# Patient Record
Sex: FEMALE | Race: WHITE | NOT HISPANIC OR LATINO | URBAN - METROPOLITAN AREA
[De-identification: names, ages, dates, MRNs, and addresses within clinical notes are randomized per-mention and may not be internally consistent; named-entity substitution may affect disease eponyms.]

---

## 2018-10-30 ENCOUNTER — OFFICE (OUTPATIENT)
Dept: URBAN - METROPOLITAN AREA CLINIC 101 | Facility: CLINIC | Age: 33
End: 2018-10-30
Payer: COMMERCIAL

## 2018-10-30 VITALS
SYSTOLIC BLOOD PRESSURE: 118 MMHG | HEART RATE: 73 BPM | DIASTOLIC BLOOD PRESSURE: 77 MMHG | WEIGHT: 266 LBS | TEMPERATURE: 98.1 F | HEIGHT: 64 IN

## 2018-10-30 DIAGNOSIS — R14.1 GAS PAIN: ICD-10-CM

## 2018-10-30 DIAGNOSIS — R11.2 NAUSEA WITH VOMITING, UNSPECIFIED: ICD-10-CM

## 2018-10-30 DIAGNOSIS — R10.13 EPIGASTRIC PAIN: ICD-10-CM

## 2018-10-30 DIAGNOSIS — K59.09 OTHER CONSTIPATION: ICD-10-CM

## 2018-10-30 DIAGNOSIS — R63.4 ABNORMAL WEIGHT LOSS: ICD-10-CM

## 2018-10-30 DIAGNOSIS — R63.0 ANOREXIA: ICD-10-CM

## 2018-10-30 PROCEDURE — 99244 OFF/OP CNSLTJ NEW/EST MOD 40: CPT

## 2018-10-30 NOTE — SERVICEHPINOTES
SAMSON PIERRE   is a   33   year old    female who is being seen in consultation at the request of   ANDREE GOODWIN   for nausea, abdominal pain, gas. She reports approx. 3 months ago started having burning in chest, which went to epigastric pain. Nausea, couldn't eat. Was started on pantoprazole and probiotic which caused constipation. She also started on metformin and could not eat. She developed RLQ pain--PCP ordered a CT scan which was normal. But still could not eat--lost 15 lbs in a few weeks due to nausea. Cut metformin in half and restarted PPI--feels "ok" but still really gassy. Everything sits in epigastrium. Burning has resolved. BMs once daily, BSS type 2-3 feels incompletely evacuated. No blood in the stool or dark stools. Has not taken anything for BMs--was told to take stool softeners but has not yet taken. Was vomiting 1-2x/week at night prior to going to bed and then would feel better. Since starting PPI and taking regularly has not vomited. No regular NSAIDs. 1/2 ppd smoker. No alcohol. Tea and soda daily--has tried to cut back on soda due to gas. No fever or chills. She has never had a colonoscopy or EGD.BRGrandfather had colon cancer, was dx around age 60-70s. Father had pancreatic cancer but had colectomy? unsure why.KAREN

## 2018-11-15 ENCOUNTER — ON CAMPUS - OUTPATIENT (OUTPATIENT)
Dept: URBAN - METROPOLITAN AREA HOSPITAL 14 | Facility: HOSPITAL | Age: 33
End: 2018-11-15

## 2018-11-15 DIAGNOSIS — R63.4 ABNORMAL WEIGHT LOSS: ICD-10-CM

## 2018-11-15 DIAGNOSIS — R10.84 GENERALIZED ABDOMINAL PAIN: ICD-10-CM

## 2018-11-15 DIAGNOSIS — R11.2 NAUSEA WITH VOMITING, UNSPECIFIED: ICD-10-CM

## 2018-11-15 DIAGNOSIS — K29.60 OTHER GASTRITIS WITHOUT BLEEDING: ICD-10-CM

## 2018-11-15 PROCEDURE — 43239 EGD BIOPSY SINGLE/MULTIPLE: CPT

## 2018-12-19 ENCOUNTER — OFFICE (OUTPATIENT)
Dept: URBAN - METROPOLITAN AREA CLINIC 101 | Facility: CLINIC | Age: 33
End: 2018-12-19

## 2018-12-19 DIAGNOSIS — K76.0 FATTY (CHANGE OF) LIVER, NOT ELSEWHERE CLASSIFIED: ICD-10-CM

## 2018-12-19 PROCEDURE — 91200 LIVER ELASTOGRAPHY: CPT

## 2020-01-02 ENCOUNTER — OFFICE (OUTPATIENT)
Dept: URBAN - METROPOLITAN AREA CLINIC 101 | Facility: CLINIC | Age: 35
End: 2020-01-02

## 2020-01-02 DIAGNOSIS — K76.0 FATTY (CHANGE OF) LIVER, NOT ELSEWHERE CLASSIFIED: ICD-10-CM

## 2020-01-02 PROCEDURE — 91200 LIVER ELASTOGRAPHY: CPT

## 2020-01-16 ENCOUNTER — OFFICE (OUTPATIENT)
Dept: URBAN - METROPOLITAN AREA CLINIC 101 | Facility: CLINIC | Age: 35
End: 2020-01-16

## 2020-01-16 VITALS
WEIGHT: 281 LBS | HEART RATE: 69 BPM | SYSTOLIC BLOOD PRESSURE: 132 MMHG | TEMPERATURE: 98.1 F | DIASTOLIC BLOOD PRESSURE: 92 MMHG | HEIGHT: 64 IN

## 2020-01-16 DIAGNOSIS — R10.13 EPIGASTRIC PAIN: ICD-10-CM

## 2020-01-16 DIAGNOSIS — K76.0 FATTY (CHANGE OF) LIVER, NOT ELSEWHERE CLASSIFIED: ICD-10-CM

## 2020-01-16 PROCEDURE — 99214 OFFICE O/P EST MOD 30 MIN: CPT

## 2020-01-16 RX ORDER — PANTOPRAZOLE SODIUM 40 MG/1
TABLET, DELAYED RELEASE ORAL
Qty: 90 | Refills: 3 | Status: ACTIVE

## 2020-01-16 NOTE — SERVICEHPINOTES
SAMSON PIERRE   is a   34  female who presents for follow up. Still taking pantoprazole which works well for her unless she does not follow her diet--symptoms are mainly epigastric pain, chest pain. EGD 11/15/18 with mild gastritis only.  No nausea/vomiting or dysphagia. US on 11/27/18 with hepatic steatosis. Fibroscan 12/2018 and 1/2/2020 with F0-F1, S3. She is getting labs done with PCP here shortly. LFTs from 10/2018 were wnl.

## 2021-04-30 ENCOUNTER — OFFICE (OUTPATIENT)
Dept: URBAN - METROPOLITAN AREA CLINIC 102 | Facility: CLINIC | Age: 36
End: 2021-04-30

## 2021-04-30 VITALS
DIASTOLIC BLOOD PRESSURE: 89 MMHG | TEMPERATURE: 97.7 F | HEIGHT: 64 IN | HEART RATE: 63 BPM | SYSTOLIC BLOOD PRESSURE: 126 MMHG | WEIGHT: 273 LBS

## 2021-04-30 DIAGNOSIS — R10.13 EPIGASTRIC PAIN: ICD-10-CM

## 2021-04-30 DIAGNOSIS — R11.2 NAUSEA WITH VOMITING, UNSPECIFIED: ICD-10-CM

## 2021-04-30 PROCEDURE — 99214 OFFICE O/P EST MOD 30 MIN: CPT

## 2021-04-30 RX ORDER — SUCRALFATE 1 G/1
TABLET ORAL
Qty: 0 | Refills: 0 | Status: ACTIVE

## 2021-04-30 RX ORDER — DEXLANSOPRAZOLE 60 MG/1
CAPSULE, DELAYED RELEASE ORAL
Qty: 0 | Refills: 0 | Status: ACTIVE

## 2021-04-30 NOTE — SERVICEHPINOTES
SAMSON PIERRE   is a   36  female who complains of  n/v, abd pain. She has h/o GERD and has been well controlled on pantoprazole 40mg qAM until recently. Reports gas pain, n/v, epigastric pain starting a few months ago. Tried 2 weeks of OTC Nexium although this did not help . eports nausea/vomiting after eating. Pain starts within 20 minutes of eating. Burning. feels better after vomiting only for a short amt of time but then symptoms return. Vomiting ongoing for a few months. No regular NSAIDs--takes aleve maybe once per month. No dysphagia or odynophagia. Reports darker  stool recently. Tried pepto bismol once but that made sx worse. BMs daily, solid. No rectal bleeding. No new medication changes.BRShe had an abd US on 4/7 which showed known enlarged fatty liver but o/w normal.BR4/3 hgb 13.5, normal LFTs. EGD 11/2018 showed mild gastritis but o/w normal, neg h pylori. BRCT scan with IV contrast 10/2018 was unremarkable. Also had CT in 2019, results not available but reportedly normal.   BR